# Patient Record
Sex: FEMALE | URBAN - METROPOLITAN AREA
[De-identification: names, ages, dates, MRNs, and addresses within clinical notes are randomized per-mention and may not be internally consistent; named-entity substitution may affect disease eponyms.]

---

## 2020-12-31 ENCOUNTER — APPOINTMENT (RX ONLY)
Dept: URBAN - METROPOLITAN AREA CLINIC 41 | Facility: CLINIC | Age: 45
Setting detail: DERMATOLOGY
End: 2020-12-31

## 2020-12-31 DIAGNOSIS — L90.8 OTHER ATROPHIC DISORDERS OF SKIN: ICD-10-CM

## 2020-12-31 DIAGNOSIS — L90.5 SCAR CONDITIONS AND FIBROSIS OF SKIN: ICD-10-CM

## 2020-12-31 PROCEDURE — ? COUNSELING

## 2020-12-31 PROCEDURE — ? COSMETIC CONSULTATION: FILLERS

## 2020-12-31 PROCEDURE — ? TREATMENT REGIMEN

## 2020-12-31 PROCEDURE — ? SUBCISION

## 2020-12-31 ASSESSMENT — LOCATION DETAILED DESCRIPTION DERM
LOCATION DETAILED: RIGHT INFERIOR CENTRAL MALAR CHEEK
LOCATION DETAILED: LEFT CHIN
LOCATION DETAILED: LEFT INFERIOR CENTRAL MALAR CHEEK

## 2020-12-31 ASSESSMENT — LOCATION SIMPLE DESCRIPTION DERM
LOCATION SIMPLE: LEFT CHEEK
LOCATION SIMPLE: RIGHT CHEEK
LOCATION SIMPLE: CHIN

## 2020-12-31 ASSESSMENT — LOCATION ZONE DERM: LOCATION ZONE: FACE

## 2020-12-31 NOTE — PROCEDURE: TREATMENT REGIMEN
Plan: Discussed doing subcision on her right cheek and left cheek (only charging $400 due to minimal scarring on left cheek) and microneedling with PRP on entire face after large divoted scars are improved. Discussed doing PRP with microneedling.
Detail Level: Zone
Plan: Discussed 4 syringes of Restalyne Lyft on cheeks or 5 vials of Sculptra on cheeks, jawline and hairline. Patient is prepaying for 3 vials of Sculptra today at $450 a vial, she is aware that her next 2 vials will be $550 per vial. \\n\\nThen 1 syringe of a Restalyne-L under eyes

## 2020-12-31 NOTE — PROCEDURE: SUBCISION
Detail Level: Simple
Treatment Number: 1
Subcision Instrument: 18G needle
Anesthesia Type: 1% lidocaine with epinephrine
Anesthesia Volume In Cc: 2
Topical Anesthesia?: BLT ointment (benzocaine 20%, lidocaine 10%, tetracaine 10%)
Hemostasis: None
Wound Care: Petrolatum
Price (Use Numbers Only, No Special Characters Or $): 400.00
Consent was obtained from the patient. The risks and benefits to therapy were discussed in detail. Specifically, the risks of infection, scarring, bleeding, prolonged wound healing, incomplete removal, allergy to anesthesia, nerve injury and recurrence were addressed. Prior to the procedure, the treatment site was clearly identified and confirmed by the patient. All components of Universal Protocol/PAUSE Rule completed.
Post-Care Instructions: I reviewed with the patient in detail post-care instructions. Patient is to keep the biopsy site dry overnight, and then apply bacitracin twice daily until healed. Patient may apply hydrogen peroxide soaks to remove any crusting.

## 2021-01-28 ENCOUNTER — APPOINTMENT (RX ONLY)
Dept: URBAN - METROPOLITAN AREA CLINIC 41 | Facility: CLINIC | Age: 46
Setting detail: DERMATOLOGY
End: 2021-01-28

## 2021-01-28 DIAGNOSIS — L90.5 SCAR CONDITIONS AND FIBROSIS OF SKIN: ICD-10-CM

## 2021-01-28 PROCEDURE — ? SUBCISION

## 2021-01-28 PROCEDURE — ? COUNSELING

## 2021-01-28 ASSESSMENT — LOCATION DETAILED DESCRIPTION DERM
LOCATION DETAILED: LEFT INFERIOR CENTRAL MALAR CHEEK
LOCATION DETAILED: RIGHT INFERIOR CENTRAL MALAR CHEEK

## 2021-01-28 ASSESSMENT — LOCATION ZONE DERM: LOCATION ZONE: FACE

## 2021-01-28 ASSESSMENT — LOCATION SIMPLE DESCRIPTION DERM
LOCATION SIMPLE: LEFT CHEEK
LOCATION SIMPLE: RIGHT CHEEK

## 2021-01-28 NOTE — PROCEDURE: SUBCISION
Detail Level: Simple
Treatment Number: 2
Subcision Instrument: 18G needle
Anesthesia Type: 1% lidocaine with epinephrine
Anesthesia Volume In Cc: 5
Hemostasis: None
Wound Care: Petrolatum
Price (Use Numbers Only, No Special Characters Or $): 400.00
Consent was obtained from the patient. The risks and benefits to therapy were discussed in detail. Specifically, the risks of infection, scarring, bleeding, prolonged wound healing, incomplete removal, allergy to anesthesia, nerve injury and recurrence were addressed. Prior to the procedure, the treatment site was clearly identified and confirmed by the patient. All components of Universal Protocol/PAUSE Rule completed.
Post-Care Instructions: I reviewed with the patient in detail post-care instructions. Patient is to keep the biopsy site dry overnight, and then apply bacitracin twice daily until healed. Patient may apply hydrogen peroxide soaks to remove any crusting.
Anesthesia Volume In Cc: 3

## 2021-02-05 ENCOUNTER — APPOINTMENT (RX ONLY)
Dept: URBAN - METROPOLITAN AREA CLINIC 41 | Facility: CLINIC | Age: 46
Setting detail: DERMATOLOGY
End: 2021-02-05

## 2021-02-05 DIAGNOSIS — L90.8 OTHER ATROPHIC DISORDERS OF SKIN: ICD-10-CM

## 2021-02-05 PROCEDURE — ? SCULPTRA

## 2021-02-05 PROCEDURE — ? COSMETIC CONSULTATION: FILLERS

## 2021-02-05 NOTE — PROCEDURE: SCULPTRA
Show Fifth Additional Location?: Yes
Right Dorsal Hand Volume In Cc: 0
Show Right And Left Pa Volume?: No
Temple Hollows Volume In Cc: 4
Treatment Number: 1
Cheeks Volume In Cc: 2
Post-Care Instructions: Patient instructed to apply ice to reduce swelling. Instructed patient to massage the area for 5 minutes 5x per day for 5 days.
Volumizer: Sculptra
Dilution Method: The Sculptra was diluted with 7 ml of bacteriastatic sterile water and 1cc 1% lidocaine without Epi for a total volume of 8ccs for each vial.
Detail Level: Detailed
Price (Use Numbers Only, No Special Characters Or $): 528
Injection Technique: The Sculptra was injected to the listed areas after cleansing the skin and providing appropriate anesthesia.
Anesthesia Type: 2% lidocaine without epinephrine
Reconstitution Date: 1-
Consent: Written consent obtained. Risks include but not limited to bruising, beading, irregular texture, ulceration, infection, allergic reaction, scar formation, incomplete augmentation, temporary nature, procedural pain.
Lot #: [id number]
Expiration Date (Month Year): 02/28/2023
Map Statement: See Attached Map for Complete Details.

## 2021-04-08 ENCOUNTER — APPOINTMENT (RX ONLY)
Dept: URBAN - METROPOLITAN AREA CLINIC 41 | Facility: CLINIC | Age: 46
Setting detail: DERMATOLOGY
End: 2021-04-08

## 2021-04-08 DIAGNOSIS — L90.8 OTHER ATROPHIC DISORDERS OF SKIN: ICD-10-CM

## 2021-04-08 PROCEDURE — ? COSMETIC CONSULTATION: FILLERS

## 2021-04-08 PROCEDURE — ? SCULPTRA

## 2021-04-08 NOTE — PROCEDURE: SCULPTRA
Show Fifth Additional Location?: Yes
Right Dorsal Hand Volume In Cc: 0
Show Right And Left Pa Volume?: No
Temple Hollows Volume In Cc: 4
Treatment Number: 2
Post-Care Instructions: Patient instructed to apply ice to reduce swelling. Instructed patient to massage the area for 5 minutes 5x per day for 5 days.
Volumizer: Sculptra
Dilution Method: The Sculptra was diluted with 8 ml of bacteriastatic sterile water and 1cc 1% lidocaine without Epi for a total volume of 9ccs for each vial.
Detail Level: Detailed
Price (Use Numbers Only, No Special Characters Or $): 313
Injection Technique: The Sculptra was injected to the listed areas after cleansing the skin and providing appropriate anesthesia.
Anesthesia Type: 2% lidocaine without epinephrine
Anesthesia Volume In Cc: 1
Reconstitution Date: 03-18-21
Consent: Written consent obtained. Risks include but not limited to bruising, beading, irregular texture, ulceration, infection, allergic reaction, scar formation, incomplete augmentation, temporary nature, procedural pain.
Lot #: [id number]
Expiration Date (Month Year): 09/30/2023
Map Statement: See Attached Map for Complete Details.